# Patient Record
(demographics unavailable — no encounter records)

---

## 2024-11-06 NOTE — HISTORY OF PRESENT ILLNESS
[FreeTextEntry1] : Ms. MORALES presents for the evaluation of HTN HLD She denies cardiac complkaints.  Feels nervous at times. States her daughters want her to see a neurologist for forgetfulness.  BP elevated but patient forgot medications.

## 2024-11-06 NOTE — DISCUSSION/SUMMARY
[FreeTextEntry1] : Repeat BP reasonable. She did not take medications today yet either. Same rx Follow up in 6 months.